# Patient Record
Sex: MALE | Employment: FULL TIME | URBAN - METROPOLITAN AREA
[De-identification: names, ages, dates, MRNs, and addresses within clinical notes are randomized per-mention and may not be internally consistent; named-entity substitution may affect disease eponyms.]

---

## 2024-02-23 ENCOUNTER — HOSPITAL ENCOUNTER (EMERGENCY)
Age: 24
Discharge: HOME | End: 2024-02-23
Payer: COMMERCIAL

## 2024-02-23 ENCOUNTER — APPOINTMENT (OUTPATIENT)
Dept: GENERAL RADIOLOGY | Age: 24
End: 2024-02-23
Payer: COMMERCIAL

## 2024-02-23 VITALS
HEART RATE: 82 BPM | RESPIRATION RATE: 18 BRPM | WEIGHT: 162.92 LBS | DIASTOLIC BLOOD PRESSURE: 87 MMHG | HEIGHT: 72 IN | OXYGEN SATURATION: 100 % | BODY MASS INDEX: 22.07 KG/M2 | TEMPERATURE: 98 F | SYSTOLIC BLOOD PRESSURE: 126 MMHG

## 2024-02-23 DIAGNOSIS — S51.812A LACERATION OF LEFT FOREARM, INITIAL ENCOUNTER: ICD-10-CM
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
## 2024-02-23 PROCEDURE — 12001 RPR S/N/AX/GEN/TRNK 2.5CM/<: CPT

## 2024-02-23 PROCEDURE — 99283 EMERGENCY DEPT VISIT LOW MDM: CPT

## 2024-02-23 PROCEDURE — 2500000003 HC RX 250 WO HCPCS: Performed by: NURSE PRACTITIONER

## 2024-02-23 PROCEDURE — 73090 X-RAY EXAM OF FOREARM: CPT

## 2024-02-23 PROCEDURE — 6370000000 HC RX 637 (ALT 250 FOR IP): Performed by: NURSE PRACTITIONER

## 2024-02-23 RX ORDER — AMOXICILLIN AND CLAVULANATE POTASSIUM 875; 125 MG/1; MG/1
1 TABLET, FILM COATED ORAL 2 TIMES DAILY
Qty: 14 TABLET | Refills: 0 | Status: SHIPPED | OUTPATIENT
Start: 2024-02-23 | End: 2024-03-01

## 2024-02-23 RX ORDER — AMOXICILLIN AND CLAVULANATE POTASSIUM 875; 125 MG/1; MG/1
1 TABLET, FILM COATED ORAL ONCE
Status: COMPLETED | OUTPATIENT
Start: 2024-02-23 | End: 2024-02-23

## 2024-02-23 RX ORDER — LIDOCAINE HYDROCHLORIDE AND EPINEPHRINE BITARTRATE 20; .01 MG/ML; MG/ML
20 INJECTION, SOLUTION SUBCUTANEOUS ONCE
Status: COMPLETED | OUTPATIENT
Start: 2024-02-23 | End: 2024-02-23

## 2024-02-23 RX ADMIN — LIDOCAINE HYDROCHLORIDE AND EPINEPHRINE 20 ML: 20; 10 INJECTION, SOLUTION INFILTRATION; PERINEURAL at 21:36

## 2024-02-23 RX ADMIN — AMOXICILLIN AND CLAVULANATE POTASSIUM 1 TABLET: 875; 125 TABLET, FILM COATED ORAL at 20:21

## 2024-02-23 NOTE — ED TRIAGE NOTES
Patient to the ER with complaints of dog bite to his left arm that happened about an hour ago.  Patient says it was a stray pitbull that bit him so he is unsure if patient is up to date on shots.

## 2024-02-24 NOTE — DISCHARGE INSTRUCTIONS
Drain wicking will be left in place and wound will be left partially open to allow for drainage due to the dog bite.    I recommend wound checks in the ED every 3 days or sooner if indications of infection/complication develop.     As I instructed you do not put any creams or ointments or lotions on this wound.  Clean it daily with gentle soap and water.  Pat it dry, air dry and then reapply the dressing that I provided you.  Check the wound at least once daily.    I am working Monday through Sunday 7 PM to 3 AM.  Feel free when you sign into the emergency department to ask for Cyndi and I will be happy to come down to the emergency department to check your wound.

## 2024-02-24 NOTE — ED NOTES
.Pt discharged at this time. Discharge instructions and medications reviewed,  Questions were answered. PT verbalized understanding.    Follow up appointments were discussed.

## 2024-02-24 NOTE — ED PROVIDER NOTES
colleague.  He did verbalize understanding.  He is discharged home with dressing supplies and prescription for antibiotic.              PROCEDURES:  Lac Repair    Date/Time: 2/23/2024 9:00 PM    Performed by: Cyndi Leach APRN - CNP  Authorized by: Cyndi Leach APRN - CNP    Consent:     Consent obtained:  Verbal    Risks discussed:  Infection, pain, retained foreign body, tendon damage, vascular damage, poor wound healing, poor cosmetic result, need for additional repair and nerve damage    Alternatives discussed:  No treatment and referral  Universal protocol:     Procedure explained and questions answered to patient or proxy's satisfaction: yes      Patient identity confirmed:  Verbally with patient  Anesthesia:     Anesthesia method:  Local infiltration    Local anesthetic:  Lidocaine 2% WITH epi  Laceration details:     Location: Left forearm.    Length (cm):  2    Depth (mm):  4  Pre-procedure details:     Preparation:  Patient was prepped and draped in usual sterile fashion and imaging obtained to evaluate for foreign bodies  Exploration:     Hemostasis achieved with:  Direct pressure    Imaging obtained: x-ray      Imaging outcome: foreign body not noted      Wound exploration: wound explored through full range of motion and entire depth of wound visualized      Wound extent: no areolar tissue violation noted, no fascia violation noted, no foreign bodies/material noted, no muscle damage noted, no nerve damage noted, no tendon damage noted, no underlying fracture noted and no vascular damage noted      Contaminated: no    Treatment:     Area cleansed with:  Soap and water    Amount of cleaning:  Standard    Irrigation solution:  Sterile saline    Irrigation volume:  200    Irrigation method:  Syringe    Debridement:  None    Undermining:  None  Skin repair:     Repair method:  Staples    Number of staples:  2  Approximation:     Approximation:  Loose  Repair type:     Repair type:

## 2024-02-26 ENCOUNTER — HOSPITAL ENCOUNTER (INPATIENT)
Age: 24
LOS: 2 days | Discharge: HOME | DRG: 603 | End: 2024-02-28
Attending: INTERNAL MEDICINE | Admitting: INTERNAL MEDICINE
Payer: COMMERCIAL

## 2024-02-26 DIAGNOSIS — L08.9 INFECTED WOUND: ICD-10-CM

## 2024-02-26 DIAGNOSIS — S51.852A DOG BITE OF LEFT FOREARM, INITIAL ENCOUNTER: Primary | ICD-10-CM

## 2024-02-26 DIAGNOSIS — L03.114 CELLULITIS OF LEFT UPPER EXTREMITY: ICD-10-CM

## 2024-02-26 DIAGNOSIS — T14.8XXA INFECTED WOUND: ICD-10-CM
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
## 2024-02-26 PROBLEM — S41.152S DOG BITE OF ARM, LEFT, SEQUELA: Status: ACTIVE | Noted: 2024-02-26

## 2024-02-26 LAB
ALBUMIN SERPL-MCNC: 5 G/DL (ref 3.4–5)
ALBUMIN/GLOB SERPL: 2 {RATIO} (ref 1.1–2.2)
ALP SERPL-CCNC: 67 U/L (ref 40–129)
ALT SERPL-CCNC: 11 U/L (ref 10–40)
ANION GAP SERPL CALCULATED.3IONS-SCNC: 12 MMOL/L (ref 3–16)
AST SERPL-CCNC: 16 U/L (ref 15–37)
BASOPHILS # BLD: 0.1 K/UL (ref 0–0.2)
BASOPHILS NFR BLD: 0.6 %
BILIRUB SERPL-MCNC: 0.4 MG/DL (ref 0–1)
BUN SERPL-MCNC: 7 MG/DL (ref 7–20)
CALCIUM SERPL-MCNC: 9.8 MG/DL (ref 8.3–10.6)
CHLORIDE SERPL-SCNC: 101 MMOL/L (ref 99–110)
CO2 SERPL-SCNC: 25 MMOL/L (ref 21–32)
CREAT SERPL-MCNC: 0.8 MG/DL (ref 0.9–1.3)
DEPRECATED RDW RBC AUTO: 12.7 % (ref 12.4–15.4)
EOSINOPHIL # BLD: 0.3 K/UL (ref 0–0.6)
EOSINOPHIL NFR BLD: 3.2 %
GFR SERPLBLD CREATININE-BSD FMLA CKD-EPI: >60 ML/MIN/{1.73_M2}
GLUCOSE SERPL-MCNC: 79 MG/DL (ref 70–99)
HCT VFR BLD AUTO: 43.5 % (ref 40.5–52.5)
HGB BLD-MCNC: 15 G/DL (ref 13.5–17.5)
LYMPHOCYTES # BLD: 3.1 K/UL (ref 1–5.1)
LYMPHOCYTES NFR BLD: 30.1 %
MCH RBC QN AUTO: 29.6 PG (ref 26–34)
MCHC RBC AUTO-ENTMCNC: 34.5 G/DL (ref 31–36)
MCV RBC AUTO: 85.8 FL (ref 80–100)
MONOCYTES # BLD: 1 K/UL (ref 0–1.3)
MONOCYTES NFR BLD: 9.4 %
NEUTROPHILS # BLD: 5.9 K/UL (ref 1.7–7.7)
NEUTROPHILS NFR BLD: 56.7 %
PLATELET # BLD AUTO: 245 K/UL (ref 135–450)
PMV BLD AUTO: 8.4 FL (ref 5–10.5)
POTASSIUM SERPL-SCNC: 3.8 MMOL/L (ref 3.5–5.1)
PROT SERPL-MCNC: 7.5 G/DL (ref 6.4–8.2)
RBC # BLD AUTO: 5.07 M/UL (ref 4.2–5.9)
SODIUM SERPL-SCNC: 138 MMOL/L (ref 136–145)
WBC # BLD AUTO: 10.3 K/UL (ref 4–11)

## 2024-02-26 PROCEDURE — 80053 COMPREHEN METABOLIC PANEL: CPT

## 2024-02-26 PROCEDURE — 6360000002 HC RX W HCPCS: Performed by: PHYSICIAN ASSISTANT

## 2024-02-26 PROCEDURE — 87040 BLOOD CULTURE FOR BACTERIA: CPT

## 2024-02-26 PROCEDURE — 2580000003 HC RX 258: Performed by: PHYSICIAN ASSISTANT

## 2024-02-26 PROCEDURE — 85025 COMPLETE CBC W/AUTO DIFF WBC: CPT

## 2024-02-26 PROCEDURE — 1200000000 HC SEMI PRIVATE

## 2024-02-26 PROCEDURE — 36415 COLL VENOUS BLD VENIPUNCTURE: CPT

## 2024-02-26 PROCEDURE — 99285 EMERGENCY DEPT VISIT HI MDM: CPT

## 2024-02-26 RX ORDER — VANCOMYCIN 1.75 G/350ML
1250 INJECTION, SOLUTION INTRAVENOUS ONCE
Status: COMPLETED | OUTPATIENT
Start: 2024-02-26 | End: 2024-02-27

## 2024-02-26 RX ORDER — METRONIDAZOLE 500 MG/100ML
500 INJECTION, SOLUTION INTRAVENOUS ONCE
Status: COMPLETED | OUTPATIENT
Start: 2024-02-26 | End: 2024-02-26

## 2024-02-26 RX ADMIN — METRONIDAZOLE 500 MG: 500 INJECTION, SOLUTION INTRAVENOUS at 22:44

## 2024-02-26 RX ADMIN — CEFEPIME 2000 MG: 2 INJECTION, POWDER, FOR SOLUTION INTRAVENOUS at 22:45

## 2024-02-26 RX ADMIN — VANCOMYCIN 1250 MG: 1.75 INJECTION, SOLUTION INTRAVENOUS at 23:40

## 2024-02-26 NOTE — ED TRIAGE NOTES
Patient to the ER for a wound check.  Patient was bit by a dog on Friday and was told to come back today to have his wound re-evaluated.
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
## 2024-02-27 PROBLEM — L03.114 CELLULITIS OF LEFT UPPER EXTREMITY: Status: ACTIVE | Noted: 2024-02-27

## 2024-02-27 PROBLEM — L08.9 INFECTED WOUND: Status: ACTIVE | Noted: 2024-02-27

## 2024-02-27 PROBLEM — S51.852A DOG BITE OF LEFT FOREARM: Status: ACTIVE | Noted: 2024-02-26

## 2024-02-27 PROBLEM — T14.8XXA INFECTED WOUND: Status: ACTIVE | Noted: 2024-02-27

## 2024-02-27 LAB
ANION GAP SERPL CALCULATED.3IONS-SCNC: 9 MMOL/L (ref 3–16)
BASOPHILS # BLD: 0.1 K/UL (ref 0–0.2)
BASOPHILS NFR BLD: 0.7 %
BUN SERPL-MCNC: 10 MG/DL (ref 7–20)
CALCIUM SERPL-MCNC: 9.1 MG/DL (ref 8.3–10.6)
CHLORIDE SERPL-SCNC: 110 MMOL/L (ref 99–110)
CO2 SERPL-SCNC: 24 MMOL/L (ref 21–32)
CREAT SERPL-MCNC: 0.9 MG/DL (ref 0.9–1.3)
DEPRECATED RDW RBC AUTO: 12.6 % (ref 12.4–15.4)
EOSINOPHIL # BLD: 0.3 K/UL (ref 0–0.6)
EOSINOPHIL NFR BLD: 3.2 %
GFR SERPLBLD CREATININE-BSD FMLA CKD-EPI: >60 ML/MIN/{1.73_M2}
GLUCOSE SERPL-MCNC: 109 MG/DL (ref 70–99)
HCT VFR BLD AUTO: 43.9 % (ref 40.5–52.5)
HGB BLD-MCNC: 14.9 G/DL (ref 13.5–17.5)
LYMPHOCYTES # BLD: 2.2 K/UL (ref 1–5.1)
LYMPHOCYTES NFR BLD: 23.6 %
MCH RBC QN AUTO: 29.4 PG (ref 26–34)
MCHC RBC AUTO-ENTMCNC: 34 G/DL (ref 31–36)
MCV RBC AUTO: 86.3 FL (ref 80–100)
MONOCYTES # BLD: 1.1 K/UL (ref 0–1.3)
MONOCYTES NFR BLD: 11.4 %
NEUTROPHILS # BLD: 5.7 K/UL (ref 1.7–7.7)
NEUTROPHILS NFR BLD: 61.1 %
PLATELET # BLD AUTO: 247 K/UL (ref 135–450)
PMV BLD AUTO: 8.5 FL (ref 5–10.5)
POTASSIUM SERPL-SCNC: 4.1 MMOL/L (ref 3.5–5.1)
RBC # BLD AUTO: 5.09 M/UL (ref 4.2–5.9)
SODIUM SERPL-SCNC: 143 MMOL/L (ref 136–145)
VANCOMYCIN TROUGH SERPL-MCNC: 13.2 UG/ML (ref 10–20)
WBC # BLD AUTO: 9.4 K/UL (ref 4–11)

## 2024-02-27 PROCEDURE — 6360000002 HC RX W HCPCS: Performed by: NURSE PRACTITIONER

## 2024-02-27 PROCEDURE — 2580000003 HC RX 258: Performed by: STUDENT IN AN ORGANIZED HEALTH CARE EDUCATION/TRAINING PROGRAM

## 2024-02-27 PROCEDURE — 1200000000 HC SEMI PRIVATE

## 2024-02-27 PROCEDURE — 87077 CULTURE AEROBIC IDENTIFY: CPT

## 2024-02-27 PROCEDURE — 2580000003 HC RX 258: Performed by: NURSE PRACTITIONER

## 2024-02-27 PROCEDURE — 99222 1ST HOSP IP/OBS MODERATE 55: CPT | Performed by: INTERNAL MEDICINE

## 2024-02-27 PROCEDURE — 6360000002 HC RX W HCPCS: Performed by: STUDENT IN AN ORGANIZED HEALTH CARE EDUCATION/TRAINING PROGRAM

## 2024-02-27 PROCEDURE — 94760 N-INVAS EAR/PLS OXIMETRY 1: CPT

## 2024-02-27 PROCEDURE — 80048 BASIC METABOLIC PNL TOTAL CA: CPT

## 2024-02-27 PROCEDURE — 80202 ASSAY OF VANCOMYCIN: CPT

## 2024-02-27 PROCEDURE — APPNB15 APP NON BILLABLE TIME 0-15 MINS: Performed by: PHYSICIAN ASSISTANT

## 2024-02-27 PROCEDURE — 36415 COLL VENOUS BLD VENIPUNCTURE: CPT

## 2024-02-27 PROCEDURE — 85025 COMPLETE CBC W/AUTO DIFF WBC: CPT

## 2024-02-27 PROCEDURE — 87070 CULTURE OTHR SPECIMN AEROBIC: CPT

## 2024-02-27 PROCEDURE — 87205 SMEAR GRAM STAIN: CPT

## 2024-02-27 PROCEDURE — 99222 1ST HOSP IP/OBS MODERATE 55: CPT | Performed by: SURGERY

## 2024-02-27 PROCEDURE — 87075 CULTR BACTERIA EXCEPT BLOOD: CPT

## 2024-02-27 PROCEDURE — APPSS15 APP SPLIT SHARED TIME 0-15 MINUTES: Performed by: PHYSICIAN ASSISTANT

## 2024-02-27 RX ORDER — POLYETHYLENE GLYCOL 3350 17 G/17G
17 POWDER, FOR SOLUTION ORAL DAILY PRN
Status: DISCONTINUED | OUTPATIENT
Start: 2024-02-27 | End: 2024-02-28 | Stop reason: HOSPADM

## 2024-02-27 RX ORDER — POTASSIUM CHLORIDE 7.45 MG/ML
10 INJECTION INTRAVENOUS PRN
Status: DISCONTINUED | OUTPATIENT
Start: 2024-02-27 | End: 2024-02-28 | Stop reason: HOSPADM

## 2024-02-27 RX ORDER — ACETAMINOPHEN 650 MG/1
650 SUPPOSITORY RECTAL EVERY 6 HOURS PRN
Status: DISCONTINUED | OUTPATIENT
Start: 2024-02-27 | End: 2024-02-28 | Stop reason: HOSPADM

## 2024-02-27 RX ORDER — SODIUM CHLORIDE 0.9 % (FLUSH) 0.9 %
5-40 SYRINGE (ML) INJECTION EVERY 12 HOURS SCHEDULED
Status: DISCONTINUED | OUTPATIENT
Start: 2024-02-27 | End: 2024-02-28 | Stop reason: HOSPADM

## 2024-02-27 RX ORDER — SODIUM CHLORIDE 9 MG/ML
INJECTION, SOLUTION INTRAVENOUS PRN
Status: DISCONTINUED | OUTPATIENT
Start: 2024-02-27 | End: 2024-02-28 | Stop reason: HOSPADM

## 2024-02-27 RX ORDER — ONDANSETRON 4 MG/1
4 TABLET, ORALLY DISINTEGRATING ORAL EVERY 8 HOURS PRN
Status: DISCONTINUED | OUTPATIENT
Start: 2024-02-27 | End: 2024-02-28 | Stop reason: HOSPADM

## 2024-02-27 RX ORDER — MAGNESIUM SULFATE IN WATER 40 MG/ML
2000 INJECTION, SOLUTION INTRAVENOUS PRN
Status: DISCONTINUED | OUTPATIENT
Start: 2024-02-27 | End: 2024-02-28 | Stop reason: HOSPADM

## 2024-02-27 RX ORDER — SODIUM CHLORIDE 0.9 % (FLUSH) 0.9 %
5-40 SYRINGE (ML) INJECTION PRN
Status: DISCONTINUED | OUTPATIENT
Start: 2024-02-27 | End: 2024-02-28 | Stop reason: HOSPADM

## 2024-02-27 RX ORDER — DIPHENHYDRAMINE HYDROCHLORIDE 50 MG/ML
25 INJECTION INTRAMUSCULAR; INTRAVENOUS EVERY 6 HOURS PRN
Status: DISCONTINUED | OUTPATIENT
Start: 2024-02-27 | End: 2024-02-28

## 2024-02-27 RX ORDER — ONDANSETRON 2 MG/ML
4 INJECTION INTRAMUSCULAR; INTRAVENOUS EVERY 6 HOURS PRN
Status: DISCONTINUED | OUTPATIENT
Start: 2024-02-27 | End: 2024-02-28 | Stop reason: HOSPADM

## 2024-02-27 RX ORDER — VANCOMYCIN 1.75 G/350ML
1250 INJECTION, SOLUTION INTRAVENOUS EVERY 8 HOURS
Status: DISCONTINUED | OUTPATIENT
Start: 2024-02-27 | End: 2024-02-27

## 2024-02-27 RX ORDER — POTASSIUM CHLORIDE 20 MEQ/1
40 TABLET, EXTENDED RELEASE ORAL PRN
Status: DISCONTINUED | OUTPATIENT
Start: 2024-02-27 | End: 2024-02-28 | Stop reason: HOSPADM

## 2024-02-27 RX ORDER — ACETAMINOPHEN 325 MG/1
650 TABLET ORAL EVERY 6 HOURS PRN
Status: DISCONTINUED | OUTPATIENT
Start: 2024-02-27 | End: 2024-02-28 | Stop reason: HOSPADM

## 2024-02-27 RX ADMIN — VANCOMYCIN HYDROCHLORIDE 1000 MG: 1 INJECTION, POWDER, LYOPHILIZED, FOR SOLUTION INTRAVENOUS at 16:56

## 2024-02-27 RX ADMIN — VANCOMYCIN 1250 MG: 1.75 INJECTION, SOLUTION INTRAVENOUS at 09:17

## 2024-02-27 RX ADMIN — CEFEPIME 2000 MG: 2 INJECTION, POWDER, FOR SOLUTION INTRAVENOUS at 12:33

## 2024-02-27 RX ADMIN — Medication 10 ML: at 09:16

## 2024-02-27 RX ADMIN — DIPHENHYDRAMINE HYDROCHLORIDE 25 MG: 50 INJECTION, SOLUTION INTRAMUSCULAR; INTRAVENOUS at 09:38

## 2024-02-27 RX ADMIN — DIPHENHYDRAMINE HYDROCHLORIDE 25 MG: 50 INJECTION, SOLUTION INTRAMUSCULAR; INTRAVENOUS at 01:06

## 2024-02-27 RX ADMIN — CEFEPIME 2000 MG: 2 INJECTION, POWDER, FOR SOLUTION INTRAVENOUS at 20:17

## 2024-02-27 RX ADMIN — Medication 10 ML: at 20:15

## 2024-02-27 ASSESSMENT — PAIN SCALES - GENERAL: PAINLEVEL_OUTOF10: 0

## 2024-02-27 NOTE — CARE COORDINATION
SW spoke with [employer] and they will run insurance and check his benefits in case IVAB are needed at discharge. It should be noted that he does NOT have a primary care physician. He will likely need to be set up wit [facility] if there is a need for infused medications at discharge.    Respectfully submitted,    Delmi FU, NORBERTO  [place] [facility]   [phone]    Electronically signed by NICKIE Galarza, SOLOMONW on 2/27/2024 at 10:00 AM

## 2024-02-27 NOTE — H&P
admission completed    Diet Diet NPO   DVT Prophylaxis [] Lovenox, []  Heparin, [] SCDs, [x] Ambulation   GI Prophylaxis [] PPI,  [] H2 Blocker,  [] Carafate,  [] Diet/Tube Feeds   Code Status Full Code   Disposition Patient requires continued admission due to complicated wound infection   MDM [] Low, [] Moderate,[]  High  Patient's risk as above due to as stated above     History of Present Illness:     Chief Complaint: Cellulitis of forearm, left  Mikal Casanova is a 23 y.o.  male  who presents with PMHx: None    HPI provided by the patient    Patient  presented to the emergency department today for as directed for a wound care check of the left forearm.  He was bitten by a friends dog on Friday.  The dog is up-to-date on vaccinations.  Patient did complete a dog bite form.  The dog was not ill.  Patient denies fever, chills, nausea, joint aches.     ED provider noted on her exam that the wound was erythematous, and had purulent drainage, packing was removed.     Ten point ROS reviewed negative, unless as noted above    Objective:   No intake or output data in the 24 hours ending 02/27/24 0028   Vitals:   Vitals:    02/26/24 1849   BP: 126/77   Pulse: 74   Resp: 16   Temp: 97.9 °F (36.6 °C)   SpO2: 99%     Physical Exam:   GEN Awake male, sitting upright in bed in no apparent distress. Appears given age.  EYES Pupils are equally round.  No scleral erythema, discharge, or conjunctivitis.  HENT Mucous membranes are moist. Oral pharynx without exudates, no evidence of thrush.  NECK Supple, no apparent thyromegaly or masses.  RESP respirations are easy and right.  CARDIO/VASC pulses are strong and regular   HEME/LYMPH:  No palpable cervical lymphadenopathy and no hepatosplenomegaly. No petechiae or ecchymoses.  MSK No gross joint deformities.  SKIN Normal coloration, warm, dry.  Left forearm erythema and cellulitis with dog bite wound.  No fluctuance.  Mild tenderness at the wound site.  Full range of motion of the

## 2024-02-27 NOTE — PLAN OF CARE
Problem: Discharge Planning  Goal: Discharge to home or other facility with appropriate resources  Outcome: Progressing     Problem: Skin/Tissue Integrity - Adult  Goal: Skin integrity remains intact  Outcome: Progressing  Goal: Incisions, wounds, or drain sites healing without S/S of infection  Outcome: Progressing

## 2024-02-27 NOTE — ED PROVIDER NOTES
[facility] EMERGENCY DEPARTMENT  EMERGENCY DEPARTMENT ENCOUNTER        Pt Name: Mikal Casanova  MRN: [id number]  Birthdate 2000  Date of evaluation: 2/26/2024  Provider: Elizabeth Huddleston PA-C  PCP: Jaun Horvath  Note Started: 9:14 PM EST 2/26/24      HECTOR. I have evaluated this patient.        CHIEF COMPLAINT       Chief Complaint   Patient presents with    Wound Check       HISTORY OF PRESENT ILLNESS: 1 or more Elements     History From: patient     Mikal Casanova is a 23 y.o. male who presents for wound check.  Patient was bit by a dog 3 days ago in the left forearm and had few staples placed.  He was instructed by provider to return to ED today for a wound check.  He has a little bit of pain.  Has noticed increased drainage recently.  No fever.  He reports the dog is UTD on vaccinations. He did not  the antibiotic on Saturday but took it yesterday,.    Nursing Notes were reviewed and agreed with or any disagreements were addressed in the HPI.    REVIEW OF SYSTEMS :      Review of Systems    Positives and Pertinent negatives as per HPI.     SURGICAL HISTORY   No past surgical history on file.    CURRENTMEDICATIONS       Previous Medications    AMOXICILLIN-CLAVULANATE (AUGMENTIN) 875-125 MG PER TABLET    Take 1 tablet by mouth 2 times daily for 7 days       ALLERGIES     Patient has no known allergies.    FAMILYHISTORY     No family history on file.     SOCIAL HISTORY       Social History     Tobacco Use    Smoking status: Some Days     Types: E-Cigarettes    Smokeless tobacco: Never   Substance Use Topics    Alcohol use: Yes     Comment: socially    Drug use: Never       SCREENINGS        Ovidio Coma Scale  Eye Opening: Spontaneous  Best Verbal Response: Oriented  Best Motor Response: Obeys commands  Ovidio Coma Scale Score: 15                CIWA Assessment  BP: 126/77  Pulse: 74           PHYSICAL EXAM  1 or more Elements     ED Triage Vitals   BP Temp Temp Source

## 2024-02-27 NOTE — CARE COORDINATION
At this time, ANR, but will follow for needs.     Delmar Mary LMSW, [facility] Social Work Case Management   Phone: [phone]  Fax: [phone]

## 2024-02-27 NOTE — ED NOTES
Pt became red and itchy while vancomycin dose infusing. 25mg benadryl given IV and rate cut in half per order of Cyndi Leach. Pt educated to let me know if he experiences anything else or new while the vancomycin finishes infusing.

## 2024-02-27 NOTE — CONSULTS
Clinical Pharmacy Note  Vancomycin Consult    Mikal Casanova is a 23 y.o. male ordered Vancomycin for SSTI; consult received from Cyndi Leach CNP to manage therapy. Also receiving cefepime.    Allergies:  Patient has no known allergies.     Temp max:  Temp (24hrs), Av.9 °F (36.6 °C), Min:97.9 °F (36.6 °C), Max:97.9 °F (36.6 °C)      Recent Labs     24  2207   WBC 10.3       Recent Labs     24  2207   BUN 7   CREATININE 0.8*       No intake or output data in the 24 hours ending 24 0105    Culture Results:      Ht Readings from Last 1 Encounters:   24 1.829 m (6')        Wt Readings from Last 1 Encounters:   24 76 kg (167 lb 8.8 oz)         Estimated Creatinine Clearance: 153 mL/min (A) (based on SCr of 0.8 mg/dL (L)).    Assessment/Plan:  Day # 1 of vancomycin.  Vancomycin 1250 mg IV every 8 hours ordered.    Goal -600  Predicted     Thank you for the consult.   Michael Otero, PharmD  
Clinical Pharmacy Note  Vancomycin Consult    Pharmacy consult received for one-time dose of vancomycin in the Emergency Department per [place]    Ht Readings from Last 1 Encounters:   02/26/24 1.829 m (6')        Wt Readings from Last 1 Encounters:   02/26/24 76 kg (167 lb 8.8 oz)         Assessment/Plan:  Vancomycin 1250 mg x 1 in ED.  If vancomycin is to continue on admission and pharmacy is to manage dosing, please re-consult with admission orders.         
and rhythm  CARDIOVASCULAR: Heart sounds are normal.  Regular rate and rhythm  ABDOMEN: Normal shape. Tenderness: absent.  RECTAL: deferred, not clinically indicated  NEUROLOGIC: There are no focalizing motor or sensory deficits. CN II-XII are grossly intact..   EXTREMITIES: Left forearm: Open wound with good granulation tissue. No purulent drainage or fluctuance. Mild surrounding induration and erythema  LABS:     Recent Labs     02/26/24 2207 02/27/24 0554   WBC 10.3 9.4   HGB 15.0 14.9   HCT 43.5 43.9    247    143   K 3.8 4.1    110   CO2 25 24   BUN 7 10   CREATININE 0.8* 0.9   CALCIUM 9.8 9.1   AST 16  --    ALT 11  --    BILITOT 0.4  --      Recent Labs     02/26/24 2207   ALKPHOS 67   ALT 11   AST 16   BILITOT 0.4   LABALBU 5.0     CBC:   Lab Results   Component Value Date/Time    WBC 9.4 02/27/2024 05:54 AM    RBC 5.09 02/27/2024 05:54 AM    HGB 14.9 02/27/2024 05:54 AM    HCT 43.9 02/27/2024 05:54 AM    MCV 86.3 02/27/2024 05:54 AM    MCH 29.4 02/27/2024 05:54 AM    MCHC 34.0 02/27/2024 05:54 AM    RDW 12.6 02/27/2024 05:54 AM     02/27/2024 05:54 AM    MPV 8.5 02/27/2024 05:54 AM     CMP:    Lab Results   Component Value Date/Time     02/27/2024 05:54 AM    K 4.1 02/27/2024 05:54 AM     02/27/2024 05:54 AM    CO2 24 02/27/2024 05:54 AM    BUN 10 02/27/2024 05:54 AM    CREATININE 0.9 02/27/2024 05:54 AM    AGRATIO 2.0 02/26/2024 10:07 PM    LABGLOM >60 02/27/2024 05:54 AM    GLUCOSE 109 02/27/2024 05:54 AM    PROT 7.5 02/26/2024 10:07 PM    LABALBU 5.0 02/26/2024 10:07 PM    CALCIUM 9.1 02/27/2024 05:54 AM    BILITOT 0.4 02/26/2024 10:07 PM    ALKPHOS 67 02/26/2024 10:07 PM    AST 16 02/26/2024 10:07 PM    ALT 11 02/26/2024 10:07 PM     Urine Culture:  No components found for: \"CURINE\"  Blood Culture:  No components found for: \"CBLOOD\", \"CFUNGUSBL\"  RADIOLOGY:     X ray left ulna (2/23/24):   Small focus of subcutaneous air along the mid forearm anteriorly which 
Collected: 02/26/24 2246   Order Status: Sent Specimen: Blood Updated: 02/26/24 2255   Culture, Anaerobic and Aerobic [[id number]]    Order Status: Canceled Specimen: Arm        Blood Culture: No results found for: \"BC\", \"BLOODCULT2\"    Viral Culture:    No results found for: \"COVID19\"  Urine Culture: No results for input(s): \"LABURIN\" in the last 72 hours.    Scheduled Meds:   sodium chloride flush  5-40 mL IntraVENous 2 times per day    cefepime  2,000 mg IntraVENous Q12H    vancomycin (VANCOCIN) intermittent dosing (placeholder)   Other RX Placeholder    vancomycin  1,250 mg IntraVENous Q8H       Continuous Infusions:   sodium chloride         PRN Meds:  sodium chloride flush, sodium chloride, potassium chloride **OR** potassium alternative oral replacement **OR** potassium chloride, magnesium sulfate, ondansetron **OR** ondansetron, polyethylene glycol, acetaminophen **OR** acetaminophen, diphenhydrAMINE    Imaging:   No orders to display       All pertinent images and reports for the current Hospitalization were reviewed by me.    IMPRESSION:    Patient Active Problem List   Diagnosis Code    Cellulitis of forearm, left L03.114    Dog bite of arm, left, sequela S41.152S, W54.0XXS        ICD-10-CM    1. Dog bite of left forearm, initial encounter  S51.852A     W54.0XXA       2. Cellulitis of left upper extremity  L03.114       3. Infected wound  T14.8XXA     L08.9          Left upper extremity  dog bite  Left upper extremity open wound  Left upper extremity cellulitis  Left upper extremity wound drainage  Wound culture in process  Blood cultures negative  No reported fever  WBC normal  Per patient dog is fully vaccinated      Tolerating antibiotic therapy okay ongoing cellulitis around the wound now resolving with IV antibiotics given the open wound it appears to have mostly skin jose l vs Dogs mouth jose l as culprits      Labs, Microbiology, Radiology and pertinent results from current hospitalization and care

## 2024-02-27 NOTE — CARE COORDINATION
Case Management Assessment  Initial Evaluation    Date/Time of Evaluation: 2/27/2024 10:05 AM  Assessment Completed by: NICKIE Galarza, KATHARINE    If patient is discharged prior to next notation, then this note serves as note for discharge by case management.    Patient Name: Mikal Casanova                   YOB: 2000  Diagnosis: Infected wound [T14.8XXA, L08.9]  Cellulitis of forearm, left [L03.114]  Cellulitis of left upper extremity [L03.114]  Dog bite of left forearm, initial encounter [S51.852A, W54.0XXA]                   Date / Time: 2/26/2024  8:08 PM    Patient Admission Status: Inpatient   Readmission Risk (Low < 19, Mod (19-27), High > 27): Readmission Risk Score: 3.6    Current PCP: Jaun Horvath  PCP verified by CM? Yes    Chart Reviewed: Yes      History Provided by: Patient  Patient Orientation: Alert and Oriented    Patient Cognition: Alert    Hospitalization in the last 30 days (Readmission):  No    If yes, Readmission Assessment in  Navigator will be completed.    Advance Directives:      Code Status: Full Code   Patient's Primary Decision Maker is: Legal Next of Kin      Discharge Planning:    Patient lives with: Family Members (both grandparents.) Type of Home: House  Primary Care Giver: Self  Patient Support Systems include: Family Members, Parent   Current Financial resources: None  Current community resources: None  Current services prior to admission: None            Current DME:              Type of Home Care services:  None    ADLS  Prior functional level: Independent in ADLs/IADLs  Current functional level: Independent in ADLs/IADLs    PT AM-PAC:   /24  OT AM-PAC:   /24    Family can provide assistance at DC: Yes  Would you like Case Management to discuss the discharge plan with any other family members/significant others, and if so, who? No  Plans to Return to Present Housing: Yes  Other Identified Issues/Barriers to RETURNING to current housing: None noted at this time.

## 2024-02-28 VITALS
HEIGHT: 72 IN | BODY MASS INDEX: 22.31 KG/M2 | TEMPERATURE: 97.9 F | DIASTOLIC BLOOD PRESSURE: 66 MMHG | RESPIRATION RATE: 16 BRPM | OXYGEN SATURATION: 96 % | HEART RATE: 72 BPM | WEIGHT: 164.68 LBS | SYSTOLIC BLOOD PRESSURE: 101 MMHG

## 2024-02-28 LAB
ANION GAP SERPL CALCULATED.3IONS-SCNC: 9 MMOL/L (ref 3–16)
BUN SERPL-MCNC: 11 MG/DL (ref 7–20)
CALCIUM SERPL-MCNC: 9.5 MG/DL (ref 8.3–10.6)
CHLORIDE SERPL-SCNC: 105 MMOL/L (ref 99–110)
CO2 SERPL-SCNC: 25 MMOL/L (ref 21–32)
CREAT SERPL-MCNC: 0.9 MG/DL (ref 0.9–1.3)
GFR SERPLBLD CREATININE-BSD FMLA CKD-EPI: >60 ML/MIN/{1.73_M2}
GLUCOSE SERPL-MCNC: 94 MG/DL (ref 70–99)
POTASSIUM SERPL-SCNC: 4 MMOL/L (ref 3.5–5.1)
SODIUM SERPL-SCNC: 139 MMOL/L (ref 136–145)

## 2024-02-28 PROCEDURE — 6360000002 HC RX W HCPCS: Performed by: NURSE PRACTITIONER

## 2024-02-28 PROCEDURE — 2580000003 HC RX 258: Performed by: INTERNAL MEDICINE

## 2024-02-28 PROCEDURE — 80048 BASIC METABOLIC PNL TOTAL CA: CPT

## 2024-02-28 PROCEDURE — 2580000003 HC RX 258: Performed by: STUDENT IN AN ORGANIZED HEALTH CARE EDUCATION/TRAINING PROGRAM

## 2024-02-28 PROCEDURE — 36415 COLL VENOUS BLD VENIPUNCTURE: CPT

## 2024-02-28 PROCEDURE — 6360000002 HC RX W HCPCS: Performed by: INTERNAL MEDICINE

## 2024-02-28 PROCEDURE — 99232 SBSQ HOSP IP/OBS MODERATE 35: CPT | Performed by: INTERNAL MEDICINE

## 2024-02-28 PROCEDURE — 94760 N-INVAS EAR/PLS OXIMETRY 1: CPT

## 2024-02-28 PROCEDURE — 99231 SBSQ HOSP IP/OBS SF/LOW 25: CPT | Performed by: SURGERY

## 2024-02-28 PROCEDURE — 6360000002 HC RX W HCPCS: Performed by: STUDENT IN AN ORGANIZED HEALTH CARE EDUCATION/TRAINING PROGRAM

## 2024-02-28 RX ORDER — DIPHENHYDRAMINE HCL 25 MG
25 TABLET ORAL EVERY 6 HOURS PRN
Status: DISCONTINUED | OUTPATIENT
Start: 2024-02-28 | End: 2024-02-28 | Stop reason: HOSPADM

## 2024-02-28 RX ORDER — AMOXICILLIN AND CLAVULANATE POTASSIUM 875; 125 MG/1; MG/1
1 TABLET, FILM COATED ORAL 2 TIMES DAILY
Qty: 10 TABLET | Refills: 0 | Status: SHIPPED | OUTPATIENT
Start: 2024-02-28 | End: 2024-03-04

## 2024-02-28 RX ADMIN — AMPICILLIN SODIUM AND SULBACTAM SODIUM 3000 MG: 2; 1 INJECTION, POWDER, FOR SOLUTION INTRAMUSCULAR; INTRAVENOUS at 11:18

## 2024-02-28 RX ADMIN — VANCOMYCIN HYDROCHLORIDE 1000 MG: 1 INJECTION, POWDER, LYOPHILIZED, FOR SOLUTION INTRAVENOUS at 00:56

## 2024-02-28 RX ADMIN — DIPHENHYDRAMINE HYDROCHLORIDE 25 MG: 50 INJECTION, SOLUTION INTRAMUSCULAR; INTRAVENOUS at 00:13

## 2024-02-28 RX ADMIN — VANCOMYCIN HYDROCHLORIDE 1000 MG: 1 INJECTION, POWDER, LYOPHILIZED, FOR SOLUTION INTRAVENOUS at 08:42

## 2024-02-28 NOTE — PLAN OF CARE
Problem: Discharge Planning  Goal: Discharge to home or other facility with appropriate resources  Outcome: Progressing     Problem: Skin/Tissue Integrity - Adult  Goal: Skin integrity remains intact  Outcome: Progressing  Flowsheets (Taken 2/27/2024 2045)  Skin Integrity Remains Intact: Monitor for areas of redness and/or skin breakdown  Goal: Incisions, wounds, or drain sites healing without S/S of infection  Outcome: Progressing  Flowsheets (Taken 2/27/2024 2045)  Incisions, Wounds, or Drain Sites Healing Without Sign and Symptoms of Infection: TWICE DAILY: Assess and document skin integrity     Problem: Pain  Goal: Verbalizes/displays adequate comfort level or baseline comfort level  Outcome: Progressing

## 2024-02-28 NOTE — PROGRESS NOTES
General Surgery  Daily Progress Note    Pt Name: Mikal Casanova  Medical Record Number: [id number]  Date of Birth 2000   Today's Date: 2/28/2024    Chief Complaint   Patient presents with    Wound Check       ASSESSMENT/PLAN  Left forearm dog bit with cellulitis - cellulitis improving, with decrease in erythema and induration.  Continue IV antibiotics.  Await culture results.  Wound without drainage, and no need for debridement.  OK for discharge home per surgery.  Hopeful discharge home on oral antibiotics per ID later today.  Follow up with me in 10 days for a wound check.      SUBJECTIVE  Mikal has improved from yesterday. He denies any forearm pain or weakness with his hand. Current activity is ad fatoumata    OBJECTIVE  VITALS:  height is 1.829 m (6') and weight is 74.7 kg (164 lb 10.9 oz). His oral temperature is 97.9 °F (36.6 °C). His blood pressure is 101/66 and his pulse is 72. His respiration is 16 and oxygen saturation is 96%.   GENERAL: alert, no distress  LUNGS: normal respiratory effort, no accessory muscle use  EXTREMITY: left forearm wound clean without drainage, decreased erythema and induration, no fluctuance  I/O last 3 completed shifts:  In: 240 [P.O.:240]  Out: -   No intake/output data recorded.    LABS  Recent Labs     02/26/24  2207 02/27/24  0554 02/28/24  0650   WBC 10.3 9.4  --    HGB 15.0 14.9  --    HCT 43.5 43.9  --     247  --     143 139   K 3.8 4.1 4.0    110 105   CO2 25 24 25   BUN 7 10 11   CREATININE 0.8* 0.9 0.9   CALCIUM 9.8 9.1 9.5   AST 16  --   --    ALT 11  --   --    BILITOT 0.4  --   --    CBC with Differential:    Lab Results   Component Value Date/Time    WBC 9.4 02/27/2024 05:54 AM    RBC 5.09 02/27/2024 05:54 AM    HGB 14.9 02/27/2024 05:54 AM    HCT 43.9 02/27/2024 05:54 AM     02/27/2024 05:54 AM    MCV 86.3 02/27/2024 05:54 AM    MCH 29.4 02/27/2024 05:54 AM    MCHC 34.0 02/27/2024 05:54 AM    RDW 12.6 02/27/2024 05:54 AM    
      Infectious Diseases Inpatient Progress Note          CHIEF COMPLAINT:     Left upper extremity open wound   left upper extremity cellulitis   left upper extremity dog bite  Left forearm pain      HISTORY OF PRESENT ILLNESS:  23 y.o. man admitted with left upper extremity open wound cellulitis secondary to dog bite.  Patient sustained dog bite on Friday last week it was his friend's dog was on the left upper extremity forearm area, wound was thoroughly cleaned in the ED he had 2 staples placed wound packing was then was discharged with course of oral Augmentin unfortunately patient did not start his antibiotics until Sunday now presented to the hospital secondary to worsening wound infection and drainage.  Wound culture sent obtained blood culture in process labs indicate creatinine 0.8 LFT normal WBC normal, x-ray of the left radius ulna  visit on 2/23/24 negative for any foreign body.  Given the need for IV antibiotic we are consulted for recommendations.    Interval history: Tolerating antibiotic therapy okay pain seems to be going down not much drainage wound cultures negative WBC normal, cellulitis seem to be resolving    Past Medical History:    No past medical history on file.    Past Surgical History:    No past surgical history on file.    Current Medications:    No outpatient medications have been marked as taking for the 2/26/24 encounter (Hospital Encounter).       Allergies:  Vancomycin    Immunizations :   Immunization History   Administered Date(s) Administered    COVID-19, PFIZER PURPLE top, DILUTE for use, (age 12 y+), 30mcg/0.3mL 01/26/2022         Social History:     Social History     Tobacco Use    Smoking status: Some Days     Types: E-Cigarettes    Smokeless tobacco: Never   Substance Use Topics    Alcohol use: Yes     Comment: socially    Drug use: Never     Social History     Tobacco Use   Smoking Status Some Days    Types: E-Cigarettes   Smokeless Tobacco Never      Family History L no 
    V2.0    [facility] Progress Note      Name:  Mikal Casanova /Age/Sex: 2000  (23 y.o. male)   MRN & CSN:  [id number] & [id number] Encounter Date/Time: 2024 1:09 PM EST   Location:  G6H-2646/4257-01 PCP: Jaun Horvath     Attending:Jonny Plummer MD       Hospital Day: 3    Assessment and Recommendations   Mikal Casanova is a 23 y.o. male  who presents with Cellulitis of forearm, left      Plan:     Left forearm cellulitis  -After dog bite.   -No retained foreign body noted on imaging  -Infectious disease and general surgery consulted.  Appreciate recommendations. Started on Vanco and cefepime.  Now those have been discontinued and patient started on Unasyn as per infectious disease.  Anticipate discharge on oral antibiotics within 24 hours pending results of the wound culture..      Diet ADULT DIET; Regular   DVT Prophylaxis Ambulation   Code Status Full Code   Disposition From: Home  Expected Disposition: Home  Estimated Date of Discharge: 24 hours  Patient requires continued admission due to cellulitis   Surrogate Decision Maker/ POA Parent Cleopatra     Personally reviewed Lab Studies and Imaging       Imaging that was interpreted personally includes x-ray of the radius and ulna and results no retained foreign body    Drugs that require monitoring for toxicity include vancomycin and the method of monitoring was creatinine        Subjective:     Chief Complaint:   Chief Complaint   Patient presents with    Wound Check     Feels okay.  Reports pain is controlled.  Denies any chest pain, shortness of breath, nausea or vomiting.  Reports he is looking forward to going home.  Review of Systems:      Pertinent positives and negatives discussed in HPI    Objective:   No intake or output data in the 24 hours ending 24 1342   Vitals:   Vitals:    24 1033 24 1955 24 0624 24 0806   BP:  109/67  101/66   Pulse:  74  72   Resp: 16 18  16   Temp:  98.4 °F (36.9 °C)  97.9 °F (36.6 °C) 
4 Eyes Skin Assessment     NAME:  Mikal Casanova  YOB: 2000  MEDICAL RECORD NUMBER:  [id number]    The patient is being assessed for  Admission    I agree that at least one RN has performed a thorough Head to Toe Skin Assessment on the patient. ALL assessment sites listed below have been assessed.      Areas assessed by both nurses:    Head, Face, Ears, Shoulders, Back, Chest, Arms, Elbows, Hands, Sacrum. Buttock, Coccyx, Ischium, and Legs. Feet and Heels        Does the Patient have a Wound? Yes wound(s) were present on assessment. LDA wound assessment was Initiated and completed by RN       Christopher Prevention initiated by RN: No  Wound Care Orders initiated by RN: No    Pressure Injury (Stage 3,4, Unstageable, DTI, NWPT, and Complex wounds) if present, place Wound referral order by RN under : No    New Ostomies, if present place, Ostomy referral order under : No     Nurse 1 eSignature: Electronically signed by Abdirizak Adkins RN on 2/27/24 at 11:03 AM EST    **SHARE this note so that the co-signing nurse can place an eSignature**    Nurse 2 eSignature: Electronically signed by Joleen Plummer RN on 2/27/24 at 11:05 AM EST    
Clinical Pharmacy Note  Vancomycin Consult    Mikal Casanova is a 23 y.o. male ordered Vancomycin for SSTI; consult received from Cyndi Leach CNP to manage therapy. Also receiving cefepime.    Allergies:  Vancomycin     Temp max:  Temp (24hrs), Av.8 °F (36.6 °C), Min:97.6 °F (36.4 °C), Max:97.9 °F (36.6 °C)      Recent Labs     24  0554   WBC 10.3 9.4         Recent Labs     24  0554   BUN 7 10   CREATININE 0.8* 0.9         No intake or output data in the 24 hours ending 24 1158    Culture Results:  pending    Ht Readings from Last 1 Encounters:   24 1.829 m (6')        Wt Readings from Last 1 Encounters:   24 76 kg (167 lb 8.8 oz)         Estimated Creatinine Clearance: 136 mL/min (based on SCr of 0.9 mg/dL).    Assessment/Plan:  Day # 2 of vancomycin.  Currently on vancomycin 1250 mg IV every 8 hours  Developed reaction - sounds like Red Man syndrome. Infusion rate cut in half and benadryl given  Goal -600  Predicted AUC now 596    Will reduce dose to 1000 mg every 8 hours.  Checking a level tonight         Thank you for the consult.   Electronically signed by Tha Lamas RPH on 2024 at 12:00 PM    
Clinical Pharmacy Note  Vancomycin Consult    Mikal Casanova is a 23 y.o. male ordered Vancomycin for SSTI; consult received from SHAMAR Jean to manage therapy. Also receiving cefepime.    Allergies:  Vancomycin     Temp max:  Temp (24hrs), Av °F (36.7 °C), Min:97.6 °F (36.4 °C), Max:98.4 °F (36.9 °C)      Recent Labs     24  0554   WBC 10.3 9.4       Recent Labs     247 24  0554   BUN 7 10   CREATININE 0.8* 0.9         Intake/Output Summary (Last 24 hours) at 2024 0012  Last data filed at 2024 1331  Gross per 24 hour   Intake 240 ml   Output --   Net 240 ml       Culture Results:      Ht Readings from Last 1 Encounters:   24 1.829 m (6')        Wt Readings from Last 1 Encounters:   24 76 kg (167 lb 8.8 oz)         Estimated Creatinine Clearance: 136 mL/min (based on SCr of 0.9 mg/dL).    Assessment:  Day # 2 of vancomycin.  Current regimen: 1000 mg every 8 hours  Vancomycin level: 13.2 mg/L  Predicted AUC: 485    Plan:  Continue current regimen.    Thank you for the consult.   Michael Otero, PharmD    
Medication Reconciliation    List of medications patient is currently taking is complete.     Source of information: 1. Conversation with patient at bedside                                      2. EPIC records      Allergies  Patient has no known allergies.     Notes regarding home medications:   1. Patient received both doses of his antibiotic prior to arrival to the emergency department.    Heide Silva, Pharmacy Intern  2/26/2024 9:30 PM            
Patient complaining of slight itching to chest. IV Benadryl administered.    Wound to left forearm cleaned. Non-adherent dressing, dry dressing, then ace bandage applied.    Electronically signed by Abdirizak Adkins RN on 2/27/24 at 10:19 AM EST    
Patient up to room 4257 from ED. Pt A&O x4, VSS. Patient denies pain, SOB, N/V/D. Patient oriented to room and call light.    Patient asking to take shower. Bathroom set up, PIV covered at this time. Fall precautions in place.    Patient made aware of NPO status until wound further evaluated.    Electronically signed by Abdirizak Adkins RN on 2/27/24 at 8:28 AM EST    
Pt educated on discharge instructions and follow-up appointments. Pt states no further questions at this time. Pt IV removed with no complications. [facility] to [facility], transported home by grandmother.    Electronically signed by Abdirizak Adkins RN on 2/28/24 at 6:09 PM EST      
EOMI  ENT: neck supple  Cardiovascular: Regular rate.  Respiratory: Clear to auscultation  Gastrointestinal: Soft, non tender  Genitourinary: no suprapubic tenderness  Musculoskeletal: No edema  Skin: warm, dry.  Left arm wrapped in bandage.  Able to squeeze finger with good strength with left hand and no sensory deficit is noted.  Please see media tab for picture.  Neuro: Alert.  Psych: Mood appropriate.         Medications:   Medications:    sodium chloride flush  5-40 mL IntraVENous 2 times per day    cefepime  2,000 mg IntraVENous Q12H    vancomycin (VANCOCIN) intermittent dosing (placeholder)   Other RX Placeholder    vancomycin  1,000 mg IntraVENous Q8H      Infusions:    sodium chloride       PRN Meds: sodium chloride flush, 5-40 mL, PRN  sodium chloride, , PRN  potassium chloride, 40 mEq, PRN   Or  potassium alternative oral replacement, 40 mEq, PRN   Or  potassium chloride, 10 mEq, PRN  magnesium sulfate, 2,000 mg, PRN  ondansetron, 4 mg, Q8H PRN   Or  ondansetron, 4 mg, Q6H PRN  polyethylene glycol, 17 g, Daily PRN  acetaminophen, 650 mg, Q6H PRN   Or  acetaminophen, 650 mg, Q6H PRN  diphenhydrAMINE, 25 mg, Q6H PRN        Labs and Imaging   XR RADIUS ULNA LEFT (2 VIEWS)    Result Date: 2/23/2024  EXAMINATION: TWO XRAY VIEWS OF THE LEFT FOREARM 2/23/2024 8:24 pm COMPARISON: None. HISTORY: ORDERING SYSTEM PROVIDED HISTORY: dog bite TECHNOLOGIST PROVIDED HISTORY: Reason for exam:->dog bite Reason for Exam: Dog bite mid forearm FINDINGS: The radius and ulna are intact.  No fracture or dislocation is seen.  The joint spaces are intact.  There is a focal area of subcutaneous air along the mid forearm anteriorly with no foreign body in the area.  The joint spaces are intact.     Small focus of subcutaneous air along the mid forearm anteriorly which is probably due to the recent penetrating trauma to the area or less likely anaerobic infection with no obvious foreign body in the area.  Recommend clinical follow-up

## 2024-02-28 NOTE — PLAN OF CARE
Problem: Discharge Planning  Goal: Discharge to home or other facility with appropriate resources  2/28/2024 1020 by Abdirizak Adkins RN  Outcome: Progressing  2/28/2024 0200 by Suha Huerta RN  Outcome: Progressing     Problem: Skin/Tissue Integrity - Adult  Goal: Skin integrity remains intact  2/28/2024 1020 by Abdirizak Adkins RN  Outcome: Progressing  2/28/2024 0200 by Suha Huerta RN  Outcome: Progressing  Flowsheets (Taken 2/27/2024 2045)  Skin Integrity Remains Intact: Monitor for areas of redness and/or skin breakdown  Goal: Incisions, wounds, or drain sites healing without S/S of infection  2/28/2024 1020 by Abdirizak Adkins RN  Outcome: Progressing  2/28/2024 0200 by Suha Huerta RN  Outcome: Progressing  Flowsheets (Taken 2/27/2024 2045)  Incisions, Wounds, or Drain Sites Healing Without Sign and Symptoms of Infection: TWICE DAILY: Assess and document skin integrity     Problem: Pain  Goal: Verbalizes/displays adequate comfort level or baseline comfort level  2/28/2024 1020 by Abdirizak Adkins RN  Outcome: Progressing  2/28/2024 0200 by Suha Huerta RN  Outcome: Progressing

## 2024-02-28 NOTE — PLAN OF CARE
Problem: Discharge Planning  Goal: Discharge to home or other facility with appropriate resources  2/28/2024 1735 by Abdirizak Adkins RN  Outcome: Completed  2/28/2024 1020 by Abdirizak Adkins RN  Outcome: Progressing     Problem: Skin/Tissue Integrity - Adult  Goal: Skin integrity remains intact  2/28/2024 1735 by Abdirizak Adkins RN  Outcome: Completed  2/28/2024 1020 by Abdirizak Adkins RN  Outcome: Progressing  Goal: Incisions, wounds, or drain sites healing without S/S of infection  2/28/2024 1735 by Abdirizak Adkins RN  Outcome: Completed  2/28/2024 1020 by Abdirizak Adkins RN  Outcome: Progressing     Problem: Pain  Goal: Verbalizes/displays adequate comfort level or baseline comfort level  2/28/2024 1735 by Abdirizak Adkins RN  Outcome: Completed  2/28/2024 1020 by Abdirizak Adkins RN  Outcome: Progressing

## 2024-02-28 NOTE — DISCHARGE INSTRUCTIONS
Please finish antibiotics. Please followup with Surgery in their office in 10 days for wound check.

## 2024-02-28 NOTE — CARE COORDINATION
Discharge planning:   Patient currently receiving iv antibiotics.   Per chart review, ID recommending home with oral antibiotics at discharge.     Plan: Home with Family, family to transport.     Need: Verify oral medications at discharge.     NICKIE Mcgrath, LSW, Social Work/Case Management   [phone]  Electronically signed by NICKIE Mcgrath on 2/28/2024 at 9:01 AM    Met with patient at bedside. Confirmed plan is to return home with family.   PCP: Discussed with patient, patient requested [facility] PCP list. List provided.     Electronically signed by NICKIE Mcgrath on 2/28/2024 at 10:41 AM

## 2024-02-29 NOTE — DISCHARGE SUMMARY
V2.0  Discharge Summary    Name:  Mikal Casanova /Age/Sex: 2000 (23 y.o. male)   Admit Date: 2024  Discharge Date: 24    MRN & CSN:  [id number] & [id number] Encounter Date and Time 24 1:26 PM EST    Attending:  No att. providers found Discharging Provider: Jonny Plummer MD       Hospital Course:     Brief HPI: Patient  presented to the emergency department today for as directed for a wound care check of the left forearm.  He was bitten by a friends dog on Friday.  The dog is up-to-date on vaccinations.  Patient did complete a dog bite form.  The dog was not ill.  Patient denies fever, chills, nausea, joint aches.    Brief Problem Based Course:     Left forearm cellulitis  -After dog bite.  Up-to-date on tetanus vaccination.  -No retained foreign body noted on imaging  -Infectious disease and general surgery consulted.  Appreciate recommendations. Started on Vanco and cefepime.  Infectious disease evaluated this patient.  Transition to Unasyn.  Wound and blood cultures no growth to date.  Discharged home on Augmentin for 5 days as per infectious disease recommendations.  Patient to follow-up in general surgery in office within 2 weeks for wound check.  Given written instructions and follow-up information for general surgery.    The patient expressed appropriate understanding of, and agreement with the discharge recommendations, medications, and plan.     Consults this admission:  IP CONSULT TO GENERAL SURGERY  IP CONSULT TO INFECTIOUS DISEASES    Discharge Diagnosis:   Cellulitis of forearm, left  Dog bite    Discharge Instruction:   Follow up appointments: PCP, general surgery  Primary care physician: Jaun Horvath within 2 weeks  Diet: regular diet   Activity: activity as tolerated  Disposition: Discharged to:   [x]Home, []C, []SNF, []Acute Rehab, []Hospice   Condition on discharge: Stable  Labs and Tests to be Followed up as an outpatient by PCP or Specialist: Finish antibiotics.

## 2024-03-02 LAB
BACTERIA BLD CULT ORG #2: NORMAL
BACTERIA BLD CULT: NORMAL

## 2024-03-03 LAB
BACTERIA SPEC AEROBE CULT: ABNORMAL
BACTERIA SPEC AEROBE CULT: ABNORMAL
BACTERIA SPEC ANAEROBE CULT: ABNORMAL
GRAM STN SPEC: ABNORMAL
ORGANISM: ABNORMAL